# Patient Record
Sex: FEMALE | Race: WHITE | NOT HISPANIC OR LATINO
[De-identification: names, ages, dates, MRNs, and addresses within clinical notes are randomized per-mention and may not be internally consistent; named-entity substitution may affect disease eponyms.]

---

## 2019-07-11 ENCOUNTER — APPOINTMENT (OUTPATIENT)
Dept: OBGYN | Facility: CLINIC | Age: 19
End: 2019-07-11
Payer: COMMERCIAL

## 2019-07-11 VITALS
BODY MASS INDEX: 23.9 KG/M2 | DIASTOLIC BLOOD PRESSURE: 68 MMHG | WEIGHT: 140 LBS | HEIGHT: 64 IN | SYSTOLIC BLOOD PRESSURE: 112 MMHG

## 2019-07-11 DIAGNOSIS — T19.2XXA FOREIGN BODY IN VULVA AND VAGINA, INITIAL ENCOUNTER: ICD-10-CM

## 2019-07-11 LAB
BILIRUB UR QL STRIP: NORMAL
GLUCOSE UR-MCNC: NORMAL
HCG UR QL: 0.2 EU/DL
HCG UR QL: NEGATIVE
HGB UR QL STRIP.AUTO: NORMAL
KETONES UR-MCNC: NORMAL
LEUKOCYTE ESTERASE UR QL STRIP: NORMAL
NITRITE UR QL STRIP: NORMAL
PH UR STRIP: 6.5
PROT UR STRIP-MCNC: NORMAL
QUALITY CONTROL: YES
SP GR UR STRIP: 1.02

## 2019-07-11 PROCEDURE — 99213 OFFICE O/P EST LOW 20 MIN: CPT

## 2019-07-11 PROCEDURE — 81025 URINE PREGNANCY TEST: CPT

## 2019-07-11 PROCEDURE — 81003 URINALYSIS AUTO W/O SCOPE: CPT | Mod: QW

## 2019-07-11 NOTE — HISTORY OF PRESENT ILLNESS
[Last Screen ___] : last STD screen was [unfilled] [Menarche Age: ____] : age at menarche was [unfilled] [Sexually Active] : is sexually active [Male ___] : [unfilled] male [Monogamous] : is monogamous [Definite:  ___ (Date)] : the last menstrual period was [unfilled] [Contraception] : uses contraception [Condoms] : uses condoms

## 2019-07-21 NOTE — DISCUSSION/SUMMARY
[Condoms] : condom use [Pregnancy Prevention] : pregnancy prevention [FreeTextEntry1] : Options regarding birth control reviewed.  Pt aware that tampon was noted, and removed.  Pt to RTO annual yearly.  All the pt's questions and concerns were addressed.

## 2019-07-21 NOTE — PHYSICAL EXAM
[Labia Majora] : labia major [Labia Minora] : labia minora [Normal] : clitoris [Scant] : there was scant vaginal bleeding [Uterine Adnexae] : were not tender and not enlarged [FreeTextEntry4] : tampon noted, removed.

## 2019-07-30 ENCOUNTER — APPOINTMENT (OUTPATIENT)
Dept: OBGYN | Facility: CLINIC | Age: 19
End: 2019-07-30
Payer: COMMERCIAL

## 2019-07-30 ENCOUNTER — RECORD ABSTRACTING (OUTPATIENT)
Age: 19
End: 2019-07-30

## 2019-07-30 VITALS
SYSTOLIC BLOOD PRESSURE: 112 MMHG | DIASTOLIC BLOOD PRESSURE: 70 MMHG | WEIGHT: 138 LBS | HEIGHT: 64 IN | BODY MASS INDEX: 23.56 KG/M2

## 2019-07-30 DIAGNOSIS — Z82.49 FAMILY HISTORY OF ISCHEMIC HEART DISEASE AND OTHER DISEASES OF THE CIRCULATORY SYSTEM: ICD-10-CM

## 2019-07-30 DIAGNOSIS — Z80.3 FAMILY HISTORY OF MALIGNANT NEOPLASM OF BREAST: ICD-10-CM

## 2019-07-30 DIAGNOSIS — Z80.1 FAMILY HISTORY OF MALIGNANT NEOPLASM OF TRACHEA, BRONCHUS AND LUNG: ICD-10-CM

## 2019-07-30 DIAGNOSIS — Z83.3 FAMILY HISTORY OF DIABETES MELLITUS: ICD-10-CM

## 2019-07-30 DIAGNOSIS — Z78.9 OTHER SPECIFIED HEALTH STATUS: ICD-10-CM

## 2019-07-30 PROCEDURE — 99395 PREV VISIT EST AGE 18-39: CPT

## 2019-07-30 NOTE — COUNSELING
[Breast Self Exam] : breast self exam [Nutrition] : nutrition [Vitamins/Supplements] : vitamins/supplements [Exercise] : exercise [STD (testing, results, tx)] : STD (testing, results, tx) [Contraception] : contraception [Safe Sexual Practices] : safe sexual practices

## 2019-07-31 LAB
C TRACH RRNA SPEC QL NAA+PROBE: NOT DETECTED
CANDIDA VAG CYTO: NOT DETECTED
G VAGINALIS+PREV SP MTYP VAG QL MICRO: NOT DETECTED
N GONORRHOEA RRNA SPEC QL NAA+PROBE: NOT DETECTED
SOURCE AMPLIFICATION: NORMAL
T VAGINALIS VAG QL WET PREP: NOT DETECTED

## 2019-07-31 NOTE — PHYSICAL EXAM
[Awake] : awake [Alert] : alert [Soft] : soft [Oriented x3] : oriented to person, place, and time [Labia Majora] : labia major [Labia Minora] : labia minora [Normal] : clitoris [No Bleeding] : there was no active vaginal bleeding [Uterine Adnexae] : were not tender and not enlarged [Acute Distress] : no acute distress [Mass] : no breast mass [Axillary LAD] : no axillary lymphadenopathy [Nipple Discharge] : no nipple discharge [Tender] : non tender [FreeTextEntry6] : normal

## 2019-07-31 NOTE — DISCUSSION/SUMMARY
[Pregnancy Prevention] : pregnancy prevention [Condoms] : condom use [FreeTextEntry1] : Options regarding birth control reviewed.  All the pt's questions and concerns were addressed.

## 2020-02-01 ENCOUNTER — APPOINTMENT (OUTPATIENT)
Dept: OBGYN | Facility: CLINIC | Age: 20
End: 2020-02-01
Payer: COMMERCIAL

## 2020-02-01 VITALS
HEIGHT: 64 IN | WEIGHT: 130 LBS | SYSTOLIC BLOOD PRESSURE: 112 MMHG | DIASTOLIC BLOOD PRESSURE: 70 MMHG | BODY MASS INDEX: 22.2 KG/M2

## 2020-02-01 DIAGNOSIS — Z11.3 ENCOUNTER FOR SCREENING FOR INFECTIONS WITH A PREDOMINANTLY SEXUAL MODE OF TRANSMISSION: ICD-10-CM

## 2020-02-01 LAB
BILIRUB UR QL STRIP: NORMAL
COLLECTION METHOD: NORMAL
GLUCOSE UR-MCNC: NORMAL
HCG UR QL: 0.2 EU/DL
HGB UR QL STRIP.AUTO: NORMAL
KETONES UR-MCNC: NORMAL
LEUKOCYTE ESTERASE UR QL STRIP: NORMAL
NITRITE UR QL STRIP: NORMAL
PH UR STRIP: 6.5
PROT UR STRIP-MCNC: NORMAL
SP GR UR STRIP: 1.03

## 2020-02-01 PROCEDURE — 99213 OFFICE O/P EST LOW 20 MIN: CPT

## 2020-02-01 PROCEDURE — 81003 URINALYSIS AUTO W/O SCOPE: CPT | Mod: QW

## 2020-02-01 NOTE — COUNSELING
[Safe Sexual Practices] : safe sexual practices [STD (testing, results, tx)] : STD (testing, results, tx) [Vulvar Hygiene] : vulvar hygiene

## 2020-02-17 NOTE — HISTORY OF PRESENT ILLNESS
[6 Months Ago] : 6 months ago [Good] : being in good health [Reproductive Age] : is of reproductive age [Regular Exercise] : regular exercise [Healthy Diet] : a healthy diet [Sexually Active] : is sexually active [Contraception] : uses contraception [Definite:  ___ (Date)] : the last menstrual period was [unfilled] [Menarche Age: ____] : age at menarche was [unfilled] [Condoms] : uses condoms [Monogamous] : is monogamous [Male ___] : [unfilled] male [Pregnancy History] : denies prior pregnancies [de-identified] : maged/ani- 7/30/19 neg

## 2020-02-17 NOTE — PHYSICAL EXAM
[Awake] : awake [Alert] : alert [Oriented x3] : oriented to person, place, and time [Labia Minora] : labia minora [Labia Majora] : labia major [Normal] : clitoris [Multiple] : multiple [Red] : red [Location: ___] : [unfilled] [Perineum] : in the perineum [Labia] : on the labia [Pubic Area] : in the pubic area [Vulva] : on the vulva [Lesions Inguinal Entire] : on the entire groin [Lesions Buttocks Both] : on both buttocks [From ___ mm] : from [unfilled] ~Umm [Lesions Thighs Both] : on both thighs [To ___ mm] : to [unfilled] ~Umm [Acute Distress] : no acute distress [Motion Tenderness] : there was no cervical motion tenderness [FreeTextEntry4] : fleshy papules consistent with condyloma, located in the posterior fourchette, surrounding vaginal opening

## 2020-02-27 ENCOUNTER — APPOINTMENT (OUTPATIENT)
Dept: OBGYN | Facility: CLINIC | Age: 20
End: 2020-02-27

## 2020-12-01 ENCOUNTER — APPOINTMENT (OUTPATIENT)
Dept: OBGYN | Facility: CLINIC | Age: 20
End: 2020-12-01
Payer: COMMERCIAL

## 2020-12-01 VITALS
TEMPERATURE: 96.9 F | DIASTOLIC BLOOD PRESSURE: 68 MMHG | SYSTOLIC BLOOD PRESSURE: 110 MMHG | WEIGHT: 139 LBS | HEIGHT: 63 IN | BODY MASS INDEX: 24.63 KG/M2

## 2020-12-01 DIAGNOSIS — B08.1 MOLLUSCUM CONTAGIOSUM: ICD-10-CM

## 2020-12-01 DIAGNOSIS — Z00.00 ENCOUNTER FOR GENERAL ADULT MEDICAL EXAMINATION W/OUT ABNORMAL FINDINGS: ICD-10-CM

## 2020-12-01 DIAGNOSIS — N90.89 OTHER SPECIFIED NONINFLAMMATORY DISORDERS OF VULVA AND PERINEUM: ICD-10-CM

## 2020-12-01 DIAGNOSIS — Z01.419 ENCOUNTER FOR GYNECOLOGICAL EXAMINATION (GENERAL) (ROUTINE) W/OUT ABNORMAL FINDINGS: ICD-10-CM

## 2020-12-01 PROCEDURE — 99395 PREV VISIT EST AGE 18-39: CPT

## 2020-12-01 PROCEDURE — 99072 ADDL SUPL MATRL&STAF TM PHE: CPT

## 2020-12-01 NOTE — DISCUSSION/SUMMARY
[FreeTextEntry1] : Pt aware of condyloma, she will RTO in 2 weeks for application of TCA. Discussed molluscum contagiosum, self limiting, resolves on own, all questions discussed.     All the pt's questions and concerns were addressed.

## 2020-12-01 NOTE — PHYSICAL EXAM
[Examination Of The Breasts] : a normal appearance [No Discharge] : no discharge [No Masses] : no breast masses were palpable [Labia Majora] : normal [Labia Minora] : normal [Moderate] : There was moderate vaginal bleeding [Normal] : normal [Normal Position] : in a normal position [Uterine Adnexae] : normal [C.Acuminatum] : condyloma acuminatum [FreeTextEntry1] : a very small condyloma noted midline upper labia majora, one noted midline external vaginal area.  Lesions consistent with molluscum contagiosum noted on buttocks.  [FreeTextEntry4] : Pt currently has menses.

## 2020-12-01 NOTE — HISTORY OF PRESENT ILLNESS
[Y] : Patient is sexually active [N] : Patient denies prior pregnancies [Menarche Age: ____] : age at menarche was [unfilled] [Currently Active] : currently active [Men] : men [Vaginal] : vaginal [No] : No [Yes] : Yes [Condoms] : Condoms [Mammogramdate] : NA [PapSmeardate] : NA [BoneDensityDate] : NA [ColonoscopyDate] : NA [GonorrheaDate] : 2/1/2020 [ChlamydiaDate] : 2/1/2020 [LMPDate] : 11/29/2020 [PGHxTotal] : 0 [TextBox_6] : 11/29/2020 [TextBox_9] : 16 [FreeTextEntry1] : 11/29/2020

## 2020-12-04 LAB
C TRACH RRNA SPEC QL NAA+PROBE: NOT DETECTED
HPV HIGH+LOW RISK DNA PNL CVX: NOT DETECTED
N GONORRHOEA RRNA SPEC QL NAA+PROBE: NOT DETECTED
SOURCE AMPLIFICATION: NORMAL
SOURCE TP AMPLIFICATION: NORMAL
T VAGINALIS RRNA SPEC QL NAA+PROBE: NOT DETECTED

## 2020-12-07 LAB — CYTOLOGY CVX/VAG DOC THIN PREP: ABNORMAL

## 2020-12-23 PROBLEM — Z01.419 ENCOUNTER FOR ANNUAL ROUTINE GYNECOLOGICAL EXAMINATION: Status: RESOLVED | Noted: 2019-07-30 | Resolved: 2020-12-23

## 2021-01-14 ENCOUNTER — APPOINTMENT (OUTPATIENT)
Dept: OBGYN | Facility: CLINIC | Age: 21
End: 2021-01-14
Payer: COMMERCIAL

## 2021-01-14 VITALS
BODY MASS INDEX: 23.92 KG/M2 | SYSTOLIC BLOOD PRESSURE: 106 MMHG | DIASTOLIC BLOOD PRESSURE: 60 MMHG | WEIGHT: 135 LBS | HEIGHT: 63 IN

## 2021-01-14 DIAGNOSIS — R87.615 UNSATISFACTORY CYTOLOGIC SMEAR OF CERVIX: ICD-10-CM

## 2021-01-14 PROCEDURE — 99213 OFFICE O/P EST LOW 20 MIN: CPT | Mod: 25

## 2021-01-14 PROCEDURE — 99072 ADDL SUPL MATRL&STAF TM PHE: CPT

## 2021-01-14 PROCEDURE — 56501 DESTROY VULVA LESIONS SIM: CPT

## 2021-01-14 NOTE — HISTORY OF PRESENT ILLNESS
[Gonorrhea test offered] : Gonorrhea test offered [Chlamydia test offered] : Chlamydia test offered [Trichomonas test offered] : Trichomonas test offered [HPV test offered] : HPV test offered [N] : Patient denies prior pregnancies [Menarche Age: ____] : age at menarche was [unfilled] [No] : Patient does not have concerns regarding sex [Currently Active] : currently active [Y] : Patient uses contraception [Yes] : Yes [Condoms] : Condoms [TextBox_4] : Pt presents for a repeat pap. [PapSmeardate] : 12/01/2020 [GonorrheaDate] : 12/01/2020 [TextBox_31] : Unsatisfactory  [TextBox_63] : Negative [ChlamydiaDate] : 12/01/2020 [TextBox_68] : Negative [TrichomonasDate] : 12/01/2020 [TextBox_73] : Negative [HPVDate] : 12/01/2020 [TextBox_78] : Negative [LMPDate] : 12/26/2020 [PGHxTotal] : 0 [FreeTextEntry1] : 12/25/2020

## 2021-01-14 NOTE — DISCUSSION/SUMMARY
[FreeTextEntry1] : Pt aware if genital condyloma not completely resolved, notify office, I will prescribe Condylox for pt to apply.  All the pt's questions and concerns were addressed.

## 2021-01-20 LAB — CYTOLOGY CVX/VAG DOC THIN PREP: NORMAL

## 2022-07-16 ENCOUNTER — APPOINTMENT (OUTPATIENT)
Dept: OBGYN | Facility: CLINIC | Age: 22
End: 2022-07-16

## 2022-07-16 VITALS
BODY MASS INDEX: 23.57 KG/M2 | DIASTOLIC BLOOD PRESSURE: 62 MMHG | WEIGHT: 133 LBS | SYSTOLIC BLOOD PRESSURE: 108 MMHG | HEIGHT: 63 IN

## 2022-07-16 DIAGNOSIS — Z30.9 ENCOUNTER FOR CONTRACEPTIVE MANAGEMENT, UNSPECIFIED: ICD-10-CM

## 2022-07-16 DIAGNOSIS — N89.8 OTHER SPECIFIED NONINFLAMMATORY DISORDERS OF VAGINA: ICD-10-CM

## 2022-07-16 DIAGNOSIS — A63.0 ANOGENITAL (VENEREAL) WARTS: ICD-10-CM

## 2022-07-16 PROCEDURE — 99395 PREV VISIT EST AGE 18-39: CPT

## 2022-07-16 PROCEDURE — 56501 DESTROY VULVA LESIONS SIM: CPT

## 2022-07-16 RX ORDER — PODOFILOX 5 MG/G
0.5 GEL TOPICAL TWICE DAILY
Qty: 1 | Refills: 0 | Status: ACTIVE | COMMUNITY
Start: 2021-01-24 | End: 1900-01-01

## 2022-07-16 NOTE — DISCUSSION/SUMMARY
[FreeTextEntry1] : Options regarding birth control reviewed. PT aware condyloma noted, tx with TCA.  PT given rx for Condylox - reviewed how to use.   Pt to RTO annual yearly.  All the pt's questions and concerns were addressed.

## 2022-07-16 NOTE — PHYSICAL EXAM
[Appropriately responsive] : appropriately responsive [Alert] : alert [No Acute Distress] : no acute distress [Oriented x3] : oriented x3 [Examination Of The Breasts] : a normal appearance [No Discharge] : no discharge [No Masses] : no breast masses were palpable [No Lesions] : no lesions  [Labia Majora] : normal [Labia Minora] : normal [Pink Rugae] : pink rugae [No Bleeding] : There was no active vaginal bleeding [Normal] : normal [Normal Position] : in a normal position [Uterine Adnexae] : normal [C.Acuminatum] : condyloma acuminatum [FreeTextEntry1] : One small condyloma noted, left lower buttocks area - tx with TCA 85%.

## 2022-07-16 NOTE — HISTORY OF PRESENT ILLNESS
[N] : Patient denies prior pregnancies [Menarche Age: ____] : age at menarche was [unfilled] [No] : Patient does not have concerns regarding sex [Currently Active] : currently active [Y] : Patient uses contraception [Condoms] : uses condoms [PapSmeardate] : 01/14/21 [TextBox_31] : NEG [TextBox_63] : NEG [GonorrheaDate] : 12/01/20 [ChlamydiaDate] : 12/01/20 [TextBox_68] : NEG [LMPDate] : 06/19/22 [PGHxTotal] : 0 [FreeTextEntry1] : 06/19/22

## 2022-07-30 LAB
C TRACH RRNA SPEC QL NAA+PROBE: NOT DETECTED
CYTOLOGY CVX/VAG DOC THIN PREP: NORMAL
HPV HIGH+LOW RISK DNA PNL CVX: NOT DETECTED
N GONORRHOEA RRNA SPEC QL NAA+PROBE: NOT DETECTED
SOURCE TP AMPLIFICATION: NORMAL

## 2023-07-18 ENCOUNTER — APPOINTMENT (OUTPATIENT)
Dept: OBGYN | Facility: CLINIC | Age: 23
End: 2023-07-18

## 2024-05-30 ENCOUNTER — NON-APPOINTMENT (OUTPATIENT)
Age: 24
End: 2024-05-30

## 2024-05-30 ENCOUNTER — APPOINTMENT (OUTPATIENT)
Dept: OBGYN | Facility: CLINIC | Age: 24
End: 2024-05-30

## 2024-05-30 VITALS
BODY MASS INDEX: 23.21 KG/M2 | DIASTOLIC BLOOD PRESSURE: 72 MMHG | HEIGHT: 63 IN | SYSTOLIC BLOOD PRESSURE: 100 MMHG | WEIGHT: 131 LBS

## 2024-05-30 DIAGNOSIS — Z11.51 ENCOUNTER FOR SCREENING FOR HUMAN PAPILLOMAVIRUS (HPV): ICD-10-CM

## 2024-05-30 DIAGNOSIS — Z12.4 ENCOUNTER FOR SCREENING FOR MALIGNANT NEOPLASM OF CERVIX: ICD-10-CM

## 2024-05-30 DIAGNOSIS — Z30.09 ENCOUNTER FOR OTHER GENERAL COUNSELING AND ADVICE ON CONTRACEPTION: ICD-10-CM

## 2024-05-30 DIAGNOSIS — Z01.419 ENCOUNTER FOR GYNECOLOGICAL EXAMINATION (GENERAL) (ROUTINE) W/OUT ABNORMAL FINDINGS: ICD-10-CM

## 2024-05-30 LAB
HCG UR QL: NEGATIVE
QUALITY CONTROL: YES

## 2024-05-30 PROCEDURE — 99395 PREV VISIT EST AGE 18-39: CPT

## 2024-05-30 PROCEDURE — 99459 PELVIC EXAMINATION: CPT

## 2024-05-30 PROCEDURE — 81025 URINE PREGNANCY TEST: CPT

## 2024-05-30 RX ORDER — NORETHINDRONE ACETATE AND ETHINYL ESTRADIOL AND FERROUS FUMARATE 1MG-20(21)
1-20 KIT ORAL
Qty: 3 | Refills: 3 | Status: ACTIVE | COMMUNITY
Start: 2024-05-30 | End: 1900-01-01

## 2024-05-30 NOTE — HISTORY OF PRESENT ILLNESS
[Condoms] : uses condoms [Y] : Patient is sexually active [N] : Patient denies prior pregnancies [Menarche Age: ____] : age at menarche was [unfilled] [No] : Patient does not have concerns regarding sex [Currently Active] : currently active [PapSmeardate] : 07/16/2022 [TextBox_31] : NEG [HPVDate] : 07/16/2022 [TextBox_78] : NEG [LMPDate] : 05/05/2024 [FreeTextEntry1] : 05/05/2024

## 2024-06-01 ENCOUNTER — TRANSCRIPTION ENCOUNTER (OUTPATIENT)
Age: 24
End: 2024-06-01

## 2024-06-01 LAB — HPV HIGH+LOW RISK DNA PNL CVX: NOT DETECTED

## 2024-06-04 LAB — CYTOLOGY CVX/VAG DOC THIN PREP: NORMAL
